# Patient Record
Sex: FEMALE | Race: BLACK OR AFRICAN AMERICAN | NOT HISPANIC OR LATINO | URBAN - METROPOLITAN AREA
[De-identification: names, ages, dates, MRNs, and addresses within clinical notes are randomized per-mention and may not be internally consistent; named-entity substitution may affect disease eponyms.]

---

## 2017-11-13 ENCOUNTER — APPOINTMENT (RX ONLY)
Dept: URBAN - METROPOLITAN AREA CLINIC 12 | Facility: CLINIC | Age: 32
Setting detail: DERMATOLOGY
End: 2017-11-13

## 2017-11-13 DIAGNOSIS — N62 HYPERTROPHY OF BREAST: ICD-10-CM

## 2017-11-13 DIAGNOSIS — L73.2 HIDRADENITIS SUPPURATIVA: ICD-10-CM

## 2017-11-13 DIAGNOSIS — Z41.1 ENCOUNTER FOR COSMETIC SURGERY: ICD-10-CM

## 2017-11-13 PROCEDURE — 99203 OFFICE O/P NEW LOW 30 MIN: CPT

## 2017-11-13 PROCEDURE — ? COUNSELING - MACROMASTIA

## 2017-11-13 PROCEDURE — ? COUNSELING - HIDRADENITIS

## 2017-11-13 PROCEDURE — ? PATIENT SPECIFIC COUNSELING

## 2017-11-13 PROCEDURE — ? PHOTOS OBTAINED

## 2017-11-13 ASSESSMENT — LOCATION DETAILED DESCRIPTION DERM
LOCATION DETAILED: LEFT ANTERIOR PROXIMAL UPPER ARM
LOCATION DETAILED: RIGHT AXILLARY TAIL OF BREAST

## 2017-11-13 ASSESSMENT — LOCATION SIMPLE DESCRIPTION DERM
LOCATION SIMPLE: RIGHT BREAST
LOCATION SIMPLE: LEFT UPPER ARM

## 2017-11-13 ASSESSMENT — LOCATION ZONE DERM
LOCATION ZONE: ARM
LOCATION ZONE: TRUNK

## 2017-11-13 NOTE — HPI: BREAST (MACROMASTIA)
Which Breast(S) Are You Concerned About?: bilateral breasts
Which Side(S)?: both breasts
How Severe Is The Macromastia?: moderate
Is This A New Presentation, Or A Follow-Up?: Macromastia
Date?: 06/2003

## 2017-11-13 NOTE — HPI: RASH (HIDRADENITIS SUPPURATIVA)
How Severe Is It?: mild
Is This A New Presentation, Or A Follow-Up?: Rash
Additional History: New patient here for a consultation regarding HS under her underarms, which she would like removed. Her dermatologist, Dr. Ozuna , referred her to Dr. Marquez.

## 2017-11-13 NOTE — PROCEDURE: PATIENT SPECIFIC COUNSELING
Other (Free Text): New patient referred by Dr. Ozuna for HS. She previously had patient on Humira, but switched over to taltz, which has improved her condition. I informed patient that she is a great candidate for surgery. The procedure would be done in the OR, but she could go home the same day. I would excise the areas underneath her underarms. I informed her that I may or may not have to use a skin graft from her thigh. The healing process would include a wound vac for a week-10 days. I informed her that if I performed a skin graft, it would take 2-3 weeks for that area to heal.Patient mentions she flies to [place] for work frequently and I informed her she would need to stay in [place] for at least two weeks after surgery to heal.  I also informed patient I would like to Pre op her with blood labs. She would also need to increase her protein intake to help with healing.
Detail Level: Zone
Other (Free Text): Patient mentions she has been seeing a chiropractor/massage therapist for back pain due to macromastia. Patient mentioned she is interested in a breast reduction going from a Triple D to a full C. I informed patient she is a great candidate for a breast reduction, however it would be borderline whether or not insurance would cover the operation because they usually require 450-500 grams to be removed on each side and this would be hard to do if she wants to be a full C. \\nRegardless I suggested we should not send the request to insurance yet because they only give a 90 day period to operate if approved and she wants to do the hydradenditis operation first and I informed patient I would not do both operations at once.

## 2018-01-19 ENCOUNTER — APPOINTMENT (RX ONLY)
Dept: URBAN - METROPOLITAN AREA CLINIC 12 | Facility: CLINIC | Age: 33
Setting detail: DERMATOLOGY
End: 2018-01-19

## 2018-01-19 DIAGNOSIS — L73.2 HIDRADENITIS SUPPURATIVA: ICD-10-CM

## 2018-01-19 PROCEDURE — ? COUNSELING - HIDRADENITIS

## 2018-01-19 PROCEDURE — ? PATIENT SPECIFIC COUNSELING

## 2018-01-19 PROCEDURE — ? PRE-OP WORKLIST

## 2018-01-19 PROCEDURE — 99212 OFFICE O/P EST SF 10 MIN: CPT

## 2018-01-19 ASSESSMENT — LOCATION SIMPLE DESCRIPTION DERM
LOCATION SIMPLE: RIGHT AXILLARY VAULT
LOCATION SIMPLE: LEFT AXILLARY VAULT

## 2018-01-19 ASSESSMENT — LOCATION ZONE DERM: LOCATION ZONE: AXILLAE

## 2018-01-19 ASSESSMENT — LOCATION DETAILED DESCRIPTION DERM
LOCATION DETAILED: LEFT AXILLARY VAULT
LOCATION DETAILED: RIGHT AXILLARY VAULT

## 2018-01-19 NOTE — PROCEDURE: PATIENT SPECIFIC COUNSELING
Detail Level: Detailed
Other (Free Text): Discussed with patient that she may have a few visits to physical therapy to help loosen the muscle under her skin. \\nAlso discussed with patient the 2 types of closures that can be done. If there is enough available skin a z-plasty will be done. If there is not enough skin a skin graft will be placed. \\nPatient states that she is ok with a skin graft being placed.

## 2018-01-19 NOTE — HPI: PREOPERATIVE APPOINTMENT
Has The Patient Completed Informed Consent?: is scheduled to complete informed consent documentation during this visit
Date Of Procedure?: 01/25/2018
[facility]
Surgeon: Dr. Andrea Marquez

## 2018-01-19 NOTE — PROCEDURE: PRE-OP WORKLIST
Date Of Surgery: 01/25/2018
Date Of Evaluation: 01/19/2018
Surgeon: Dr. Marquez
Detail Level: Simple

## 2018-01-30 ENCOUNTER — APPOINTMENT (RX ONLY)
Dept: URBAN - METROPOLITAN AREA CLINIC 12 | Facility: CLINIC | Age: 33
Setting detail: DERMATOLOGY
End: 2018-01-30

## 2018-01-30 DIAGNOSIS — Z48.89 ENCOUNTER FOR OTHER SPECIFIED SURGICAL AFTERCARE: ICD-10-CM

## 2018-01-30 PROCEDURE — ? COUNSELING - POST-OP CHECK, COMPLEX WOUND RECONSTRUCTION

## 2018-01-30 PROCEDURE — ? PATIENT SPECIFIC COUNSELING

## 2018-01-30 PROCEDURE — ? SET GLOBAL PERIOD

## 2018-01-30 PROCEDURE — ? DRAIN REMOVAL

## 2018-01-30 PROCEDURE — ? POST-OP CHECK

## 2018-01-30 PROCEDURE — 99024 POSTOP FOLLOW-UP VISIT: CPT

## 2018-01-30 ASSESSMENT — LOCATION DETAILED DESCRIPTION DERM
LOCATION DETAILED: RIGHT AXILLARY VAULT
LOCATION DETAILED: LEFT AXILLARY VAULT

## 2018-01-30 ASSESSMENT — LOCATION SIMPLE DESCRIPTION DERM
LOCATION SIMPLE: RIGHT AXILLARY VAULT
LOCATION SIMPLE: LEFT AXILLARY VAULT

## 2018-01-30 ASSESSMENT — LOCATION ZONE DERM: LOCATION ZONE: AXILLAE

## 2018-01-30 NOTE — PROCEDURE: PATIENT SPECIFIC COUNSELING
Other (Free Text): Informed patient that the surgical area are swollen and inflamed. Explained to patient that it this could be just from the surgery itself or because the hidranitis was infected tissue. \\nRecommended that patient start doxycycline as a preventative for infection.\\nPatient will return to clinic on Friday for further evaluation.\\Bruno very pleased with how the patient is healing. Patient also has good range of motion.\\nPatient was given another script for Percocet. Informed patient that she should start weening herself off narcotics. Made patient aware that another script will not be given to avoid potential opioid addiction.
Detail Level: Detailed

## 2018-01-30 NOTE — PROCEDURE: SET GLOBAL PERIOD
Surgery Global Period: 90
Other Surgery Performed: bilateral hidradenitis excision
Date Of Surgery (Mm/Dd/Yyyy): 01/25/2018
Detail Level: Simple

## 2018-01-30 NOTE — PROCEDURE: POST-OP CHECK
Add Postop Global No-Charge Code (69532)?: yes
Wound Evaluated By: Dr. Andrea Marquez
Detail Level: Simple

## 2018-01-30 NOTE — HPI: POST-OP CHECK
History: Patient is present today for a post-op check s/p bilateral excision of hidradenitis on 01/25/18.

## 2018-02-02 ENCOUNTER — APPOINTMENT (RX ONLY)
Dept: URBAN - METROPOLITAN AREA CLINIC 12 | Facility: CLINIC | Age: 33
Setting detail: DERMATOLOGY
End: 2018-02-02

## 2018-02-02 DIAGNOSIS — T81.30X: ICD-10-CM

## 2018-02-02 DIAGNOSIS — T81.32: ICD-10-CM

## 2018-02-02 PROBLEM — T81.30XD DISRUPTION OF WOUND, UNSPECIFIED, SUBSEQUENT ENCOUNTER: Status: ACTIVE | Noted: 2018-02-02

## 2018-02-02 PROCEDURE — ? CODE 99024 - NO CHARGE POST-OP VISIT

## 2018-02-02 PROCEDURE — 99024 POSTOP FOLLOW-UP VISIT: CPT

## 2018-02-02 PROCEDURE — ? TREATMENT REGIMEN

## 2018-02-02 PROCEDURE — ? PATIENT SPECIFIC COUNSELING

## 2018-02-02 PROCEDURE — ? COUNSELING - DEHISCENCE

## 2018-02-02 ASSESSMENT — LOCATION SIMPLE DESCRIPTION DERM
LOCATION SIMPLE: LEFT AXILLARY VAULT
LOCATION SIMPLE: LEFT POSTERIOR AXILLA

## 2018-02-02 ASSESSMENT — LOCATION ZONE DERM: LOCATION ZONE: AXILLAE

## 2018-02-02 ASSESSMENT — LOCATION DETAILED DESCRIPTION DERM
LOCATION DETAILED: LEFT AXILLARY VAULT
LOCATION DETAILED: LEFT POSTERIOR AXILLA

## 2018-02-02 NOTE — PROCEDURE: TREATMENT REGIMEN
Samples Given: Patient given iodoform bottle
Detail Level: Zone
Continue Regimen: Pack wound using iodoform once daily

## 2018-02-02 NOTE — PROCEDURE: PATIENT SPECIFIC COUNSELING
Detail Level: Zone
Other (Free Text): Patient reports for routine dressing change. Kathe examined bilateral Axilla. Kathe states the R axilla looks great, however the left axilla  has 2 small holes, one being the previous drain port site, that has some drainage. After communicating with Dr. Marquez, Patient was instructed to pack both holes with iodoform 1 x a day and to follow up with Dr. Marquez on Wednesday. Patients wound was packed and redressed in the office. Patient was given instructions on looking out for redness, warmth, swelling and fever. Patient was given Kathe Mena information and was instructed to call should she have any concerns.

## 2018-02-07 ENCOUNTER — APPOINTMENT (RX ONLY)
Dept: URBAN - METROPOLITAN AREA CLINIC 12 | Facility: CLINIC | Age: 33
Setting detail: DERMATOLOGY
End: 2018-02-07

## 2018-02-07 DIAGNOSIS — T81.30X: ICD-10-CM

## 2018-02-07 DIAGNOSIS — T81.32: ICD-10-CM

## 2018-02-07 PROBLEM — T81.30XD DISRUPTION OF WOUND, UNSPECIFIED, SUBSEQUENT ENCOUNTER: Status: ACTIVE | Noted: 2018-02-07

## 2018-02-07 PROCEDURE — ? PATIENT SPECIFIC COUNSELING

## 2018-02-07 PROCEDURE — 99024 POSTOP FOLLOW-UP VISIT: CPT

## 2018-02-07 PROCEDURE — ? COUNSELING - DEHISCENCE

## 2018-02-07 PROCEDURE — ? CODE 99024 - NO CHARGE POST-OP VISIT

## 2018-02-07 PROCEDURE — ? TREATMENT REGIMEN

## 2018-02-07 ASSESSMENT — LOCATION DETAILED DESCRIPTION DERM
LOCATION DETAILED: LEFT AXILLARY VAULT
LOCATION DETAILED: LEFT POSTERIOR AXILLA

## 2018-02-07 ASSESSMENT — LOCATION ZONE DERM: LOCATION ZONE: AXILLAE

## 2018-02-07 ASSESSMENT — LOCATION SIMPLE DESCRIPTION DERM
LOCATION SIMPLE: LEFT POSTERIOR AXILLA
LOCATION SIMPLE: LEFT AXILLARY VAULT

## 2018-02-07 NOTE — PROCEDURE: PATIENT SPECIFIC COUNSELING
Detail Level: Zone
Other (Free Text): Per Dr. Marquez Patient has an infection in her left axilla. Dr. Marquez informed patient that she will need to go back into the OR, Dr. Marquez explained that he will need to open up the wound, wash the wound with antibiotics, and apply a wound vac.  Patient was informed that the wound vac will need to stay on for at least  1 month. Patient was informed that home health will be set up for routine wound vac changes. Patient was informed to continue dressing the wound with iodoform until her surgery. \\n\\n*Wound size* \\n 4cm deep \\n6 cm wide tunnel

## 2018-02-19 ENCOUNTER — APPOINTMENT (RX ONLY)
Dept: URBAN - METROPOLITAN AREA CLINIC 12 | Facility: CLINIC | Age: 33
Setting detail: DERMATOLOGY
End: 2018-02-19

## 2018-02-19 DIAGNOSIS — T81.32: ICD-10-CM

## 2018-02-19 DIAGNOSIS — T81.30X: ICD-10-CM

## 2018-02-19 PROBLEM — T81.30XD DISRUPTION OF WOUND, UNSPECIFIED, SUBSEQUENT ENCOUNTER: Status: ACTIVE | Noted: 2018-02-19

## 2018-02-19 PROCEDURE — ? CODE 99024 - NO CHARGE POST-OP VISIT

## 2018-02-19 PROCEDURE — ? PATIENT SPECIFIC COUNSELING

## 2018-02-19 PROCEDURE — ? COUNSELING - DEHISCENCE

## 2018-02-19 PROCEDURE — ? POST-OP CHECK

## 2018-02-19 PROCEDURE — 99024 POSTOP FOLLOW-UP VISIT: CPT

## 2018-02-19 ASSESSMENT — LOCATION SIMPLE DESCRIPTION DERM: LOCATION SIMPLE: LEFT AXILLARY VAULT

## 2018-02-19 ASSESSMENT — LOCATION DETAILED DESCRIPTION DERM: LOCATION DETAILED: LEFT AXILLARY VAULT

## 2018-02-19 ASSESSMENT — LOCATION ZONE DERM: LOCATION ZONE: AXILLAE

## 2018-02-19 NOTE — PROCEDURE: POST-OP CHECK
Add Postop Global No-Charge Code (65028)?: yes
Detail Level: Detailed
Wound Evaluated By: Dr. Marquez.
Other Dressings: wound vac

## 2018-02-19 NOTE — PROCEDURE: PATIENT SPECIFIC COUNSELING
Detail Level: Zone
Other (Free Text): Wound depth and width. \\n1 1/2 cm deep\\n7x3x1 1/2\\n\\Pari Marquez explained to patient that her wound looks great and is healing well, no signs of infection, bleeding, or drainage. Dr. Marquez informed patient that he will keep the wound vac on for  approximately 2 weeks. Patient is aware that home health will come to her home to change her wound vac. And she will return on Monday for follow up. Dr. Marquez encouraged patient to start moving her left elbow to prevent stiffness. Dr. Marquez removed a residual stitch from her L Axilla, which also looks great and healing well.

## 2018-02-26 ENCOUNTER — APPOINTMENT (RX ONLY)
Dept: URBAN - METROPOLITAN AREA CLINIC 12 | Facility: CLINIC | Age: 33
Setting detail: DERMATOLOGY
End: 2018-02-26

## 2018-02-26 DIAGNOSIS — T81.30X: ICD-10-CM

## 2018-02-26 DIAGNOSIS — T81.32: ICD-10-CM

## 2018-02-26 PROBLEM — T81.30XD DISRUPTION OF WOUND, UNSPECIFIED, SUBSEQUENT ENCOUNTER: Status: ACTIVE | Noted: 2018-02-26

## 2018-02-26 PROCEDURE — ? COUNSELING - DEHISCENCE

## 2018-02-26 PROCEDURE — ? PATIENT SPECIFIC COUNSELING

## 2018-02-26 PROCEDURE — 99024 POSTOP FOLLOW-UP VISIT: CPT

## 2018-02-26 PROCEDURE — ? POST-OP CHECK

## 2018-02-26 ASSESSMENT — LOCATION SIMPLE DESCRIPTION DERM: LOCATION SIMPLE: LEFT AXILLARY VAULT

## 2018-02-26 ASSESSMENT — LOCATION ZONE DERM: LOCATION ZONE: AXILLAE

## 2018-02-26 ASSESSMENT — LOCATION DETAILED DESCRIPTION DERM: LOCATION DETAILED: LEFT AXILLARY VAULT

## 2018-02-26 NOTE — PROCEDURE: PATIENT SPECIFIC COUNSELING
Detail Level: Zone
Other (Free Text): ***Wound size post wound vac***\\n8x1.5\\n.5 depth\\n\\n\\nPer Dr. Marquez her wound looks great and is healing fast and well. Patient was informed that her wound vac will be discontinued today. Patient was educated on how to dress her wound using medi honey non stick gauze and tape. Patient was informed that she may return to work on Friday 3/2/2018. Patient was given a work release note, and informed to RTC  in one week.

## 2018-02-26 NOTE — PROCEDURE: POST-OP CHECK
Other Dressings: medi honey
Wound Evaluated By: Dr. Marquez.
Add Postop Global No-Charge Code (88712)?: yes
Detail Level: Detailed

## 2018-03-05 ENCOUNTER — APPOINTMENT (RX ONLY)
Dept: URBAN - METROPOLITAN AREA CLINIC 12 | Facility: CLINIC | Age: 33
Setting detail: DERMATOLOGY
End: 2018-03-05

## 2018-03-05 DIAGNOSIS — T81.32: ICD-10-CM

## 2018-03-05 DIAGNOSIS — T81.30X: ICD-10-CM

## 2018-03-05 DIAGNOSIS — Z41.1 ENCOUNTER FOR COSMETIC SURGERY: ICD-10-CM

## 2018-03-05 DIAGNOSIS — N62 HYPERTROPHY OF BREAST: ICD-10-CM

## 2018-03-05 PROBLEM — T81.30XD DISRUPTION OF WOUND, UNSPECIFIED, SUBSEQUENT ENCOUNTER: Status: ACTIVE | Noted: 2018-03-05

## 2018-03-05 PROCEDURE — ? COUNSELING - MACROMASTIA

## 2018-03-05 PROCEDURE — 99024 POSTOP FOLLOW-UP VISIT: CPT

## 2018-03-05 PROCEDURE — ? PATIENT SPECIFIC COUNSELING

## 2018-03-05 PROCEDURE — 99213 OFFICE O/P EST LOW 20 MIN: CPT | Mod: 24

## 2018-03-05 PROCEDURE — ? SEPARATE AND IDENTIFIABLE DOCUMENTATION

## 2018-03-05 PROCEDURE — ? POST-OP CHECK

## 2018-03-05 PROCEDURE — ? COUNSELING - DEHISCENCE

## 2018-03-05 ASSESSMENT — LOCATION SIMPLE DESCRIPTION DERM
LOCATION SIMPLE: RIGHT BREAST
LOCATION SIMPLE: LEFT AXILLARY VAULT
LOCATION SIMPLE: LEFT BREAST
LOCATION SIMPLE: RIGHT AXILLARY VAULT

## 2018-03-05 ASSESSMENT — LOCATION ZONE DERM
LOCATION ZONE: TRUNK
LOCATION ZONE: AXILLAE

## 2018-03-05 ASSESSMENT — LOCATION DETAILED DESCRIPTION DERM
LOCATION DETAILED: LEFT AXILLARY VAULT
LOCATION DETAILED: RIGHT AXILLARY VAULT
LOCATION DETAILED: LEFT PERIAREOLAR BREAST 6-7:00 REGION
LOCATION DETAILED: RIGHT LATERAL BREAST 6-7:00 REGION

## 2018-03-05 NOTE — PROCEDURE: PATIENT SPECIFIC COUNSELING
Other (Free Text): Dr. Marquez evaluated patient. Patient has no complaints of fever, drainage, or boils, but does complain of sore forearms, which improve daily. Dr. Marquez states left axillary wound is healing wonderfully and states that patient has full range of motion in both her arms. There is no sign of hypertrophic scarring or keloiding. \\nMedihoney was applied to the incision site of her left axilla and a bandage was applied. \\n\\nWhile patient was here, Dr. Marquez evaluated her right axilla. He informed patient that her scar is getting thick and recommended she use the back of an electric toothbrush to massage the scar and break up the scar tissue. \\n\\nPatient was cleared to begin working out. \\nShe is to follow up in April.
Detail Level: Zone

## 2018-03-05 NOTE — HPI: BREAST (MACROMASTIA)
Which Breast(S) Are You Concerned About?: bilateral breasts
Which Side(S)?: both breasts
How Severe Is The Macromastia?: moderate
Is This A New Presentation, Or A Follow-Up?: Macromastia

## 2018-03-05 NOTE — PROCEDURE: POST-OP CHECK
Detail Level: Detailed
Add Postop Global No-Charge Code (98554)?: yes
Wound Evaluated By: Dr. Marquez.
Other Dressings: medi honey

## 2018-03-05 NOTE — PROCEDURE: PATIENT SPECIFIC COUNSELING
Other (Free Text): Patient asked Dr. Marquez about whether or not insurance would cover her breast reduction. Dr. Marquez informed patient that she was boarder line having insurance cover her procedure and having to pay out of pocket. It would depend on what size she would like to be after the reduction and how much tissue is removed during the procedure.
Detail Level: Zone

## 2018-04-24 ENCOUNTER — APPOINTMENT (RX ONLY)
Dept: URBAN - METROPOLITAN AREA CLINIC 12 | Facility: CLINIC | Age: 33
Setting detail: DERMATOLOGY
End: 2018-04-24

## 2018-04-24 DIAGNOSIS — T81.30X: ICD-10-CM

## 2018-04-24 DIAGNOSIS — T81.32: ICD-10-CM

## 2018-04-24 PROBLEM — T81.30XD DISRUPTION OF WOUND, UNSPECIFIED, SUBSEQUENT ENCOUNTER: Status: ACTIVE | Noted: 2018-04-24

## 2018-04-24 PROCEDURE — ? POST-OP CHECK

## 2018-04-24 PROCEDURE — 99024 POSTOP FOLLOW-UP VISIT: CPT

## 2018-04-24 PROCEDURE — ? COUNSELING - DEHISCENCE

## 2018-04-24 PROCEDURE — ? PATIENT SPECIFIC COUNSELING

## 2018-04-24 ASSESSMENT — LOCATION DETAILED DESCRIPTION DERM
LOCATION DETAILED: RIGHT AXILLARY VAULT
LOCATION DETAILED: LEFT AXILLARY VAULT

## 2018-04-24 ASSESSMENT — LOCATION SIMPLE DESCRIPTION DERM
LOCATION SIMPLE: LEFT AXILLARY VAULT
LOCATION SIMPLE: RIGHT AXILLARY VAULT

## 2018-04-24 ASSESSMENT — LOCATION ZONE DERM: LOCATION ZONE: AXILLAE

## 2018-04-24 NOTE — PROCEDURE: POST-OP CHECK
Add Postop Global No-Charge Code (74429)?: yes
Other Dressings: medi honey
Wound Evaluated By: Dr. Marquez.
Detail Level: Detailed

## 2018-04-24 NOTE — PROCEDURE: PATIENT SPECIFIC COUNSELING
Other (Free Text): Patient states she’s doing well and has no concerns today. Dr. Marquez examined patient and he states that her wound looks good and is well healed. Dr. Marquez advised patient continue with the silagen gel to help reduce the scar and discoloration. Dr. Marquez also informed patient to contact her insurance company to make sure they definitely will not cover her breast reduction. Patient was informed that if that is the case, he will write a letter in coordination with her pcp . Patient to follow up in 6 weeks.
Detail Level: Zone

## 2018-07-18 ENCOUNTER — APPOINTMENT (RX ONLY)
Dept: URBAN - METROPOLITAN AREA CLINIC 12 | Facility: CLINIC | Age: 33
Setting detail: DERMATOLOGY
End: 2018-07-18

## 2018-07-18 DIAGNOSIS — T81.32: ICD-10-CM

## 2018-07-18 DIAGNOSIS — T81.30X: ICD-10-CM

## 2018-07-18 PROBLEM — T81.30XD DISRUPTION OF WOUND, UNSPECIFIED, SUBSEQUENT ENCOUNTER: Status: ACTIVE | Noted: 2018-07-18

## 2018-07-18 PROCEDURE — 11900 INJECT SKIN LESIONS </W 7: CPT

## 2018-07-18 PROCEDURE — ? COUNSELING - DEHISCENCE

## 2018-07-18 PROCEDURE — ? PATIENT SPECIFIC COUNSELING

## 2018-07-18 PROCEDURE — ? INTRALESIONAL KENALOG

## 2018-07-18 PROCEDURE — ? POST-OP CHECK

## 2018-07-18 ASSESSMENT — LOCATION ZONE DERM: LOCATION ZONE: AXILLAE

## 2018-07-18 ASSESSMENT — LOCATION DETAILED DESCRIPTION DERM
LOCATION DETAILED: LEFT AXILLARY VAULT
LOCATION DETAILED: RIGHT AXILLARY VAULT

## 2018-07-18 ASSESSMENT — LOCATION SIMPLE DESCRIPTION DERM
LOCATION SIMPLE: RIGHT AXILLARY VAULT
LOCATION SIMPLE: LEFT AXILLARY VAULT

## 2018-07-18 NOTE — PROCEDURE: PATIENT SPECIFIC COUNSELING
Detail Level: Zone
Other (Free Text): Dr. Marquez recommendations: kenalog 10 will be administered today by Dr. Marquez after examination of left underarm which shows a thick scar, discussion a low dose steroid will be administered today. Patient reports full range of motion, scars are healing well. Patient will rtc in 3 month for f/u.

## 2018-07-18 NOTE — PROCEDURE: POST-OP CHECK
Other Dressings: medi honey
Add Postop Global No-Charge Code (09065)?: no
Detail Level: Detailed
Wound Evaluated By: Dr. Marquez.

## 2018-07-18 NOTE — PROCEDURE: INTRALESIONAL KENALOG
Kenalog Preparation: kenalog
Expiration Date (Optional): October 2019
Concentration (Mg/Ml): 10
Administered By (Optional): Dr. Marquez
Consent: The risks of atrophy were reviewed with the patient.
Detail Level: Detailed
Total Volume (Ccs): 0.3
Lot # (Optional): [id number]

## 2018-10-01 ENCOUNTER — APPOINTMENT (RX ONLY)
Dept: URBAN - METROPOLITAN AREA CLINIC 12 | Facility: CLINIC | Age: 33
Setting detail: DERMATOLOGY
End: 2018-10-01

## 2018-10-01 DIAGNOSIS — T81.32: ICD-10-CM

## 2018-10-01 DIAGNOSIS — T81.30X: ICD-10-CM

## 2018-10-01 PROBLEM — T81.30XD DISRUPTION OF WOUND, UNSPECIFIED, SUBSEQUENT ENCOUNTER: Status: ACTIVE | Noted: 2018-10-01

## 2018-10-01 PROCEDURE — ? PATIENT SPECIFIC COUNSELING

## 2018-10-01 PROCEDURE — ? SET GLOBAL PERIOD

## 2018-10-01 PROCEDURE — ? COUNSELING - DEHISCENCE

## 2018-10-01 PROCEDURE — ? POST-OP CHECK

## 2018-10-01 PROCEDURE — 99213 OFFICE O/P EST LOW 20 MIN: CPT

## 2018-10-01 ASSESSMENT — LOCATION DETAILED DESCRIPTION DERM
LOCATION DETAILED: LEFT AXILLARY VAULT
LOCATION DETAILED: RIGHT AXILLARY VAULT

## 2018-10-01 ASSESSMENT — LOCATION SIMPLE DESCRIPTION DERM
LOCATION SIMPLE: LEFT AXILLARY VAULT
LOCATION SIMPLE: RIGHT AXILLARY VAULT

## 2018-10-01 ASSESSMENT — LOCATION ZONE DERM: LOCATION ZONE: AXILLAE

## 2018-10-01 NOTE — PROCEDURE: POST-OP CHECK
Add Postop Global No-Charge Code (38274)?: no
Wound Evaluated By: Dr. Marquez.
Detail Level: Detailed
Detail Level: Simple
Wound Evaluated By: Dr. Marquez

## 2018-10-01 NOTE — PROCEDURE: PATIENT SPECIFIC COUNSELING
Other (Free Text): Patient reports full range of motion, scars are healing well, denies pain, drainage or lost of sensation. Dr. Marquez examined patients underarms, reports scars are looking good reassured patient that hidradenitis will not return. Will write up order for ultrasound, patient lives in [place] if needed she’ll have order and return to see us for any problems. Photos were taken and patient graduated today.
Detail Level: Zone